# Patient Record
Sex: FEMALE | Race: WHITE | NOT HISPANIC OR LATINO | Employment: FULL TIME | URBAN - METROPOLITAN AREA
[De-identification: names, ages, dates, MRNs, and addresses within clinical notes are randomized per-mention and may not be internally consistent; named-entity substitution may affect disease eponyms.]

---

## 2023-10-13 ENCOUNTER — PHARMACY VISIT (OUTPATIENT)
Dept: PHARMACY | Facility: CLINIC | Age: 28
End: 2023-10-13

## 2023-10-13 ENCOUNTER — HOSPITAL ENCOUNTER (EMERGENCY)
Facility: HOSPITAL | Age: 28
Discharge: HOME | End: 2023-10-13
Payer: COMMERCIAL

## 2023-10-13 VITALS
SYSTOLIC BLOOD PRESSURE: 114 MMHG | TEMPERATURE: 98.2 F | BODY MASS INDEX: 21.66 KG/M2 | OXYGEN SATURATION: 99 % | DIASTOLIC BLOOD PRESSURE: 76 MMHG | HEART RATE: 85 BPM | WEIGHT: 130 LBS | HEIGHT: 65 IN | RESPIRATION RATE: 16 BRPM

## 2023-10-13 DIAGNOSIS — S09.90XA ACUTE HEAD INJURY WITHOUT LOSS OF CONSCIOUSNESS, INITIAL ENCOUNTER: Primary | ICD-10-CM

## 2023-10-13 DIAGNOSIS — S06.0X0A CONCUSSION WITHOUT LOSS OF CONSCIOUSNESS, INITIAL ENCOUNTER: ICD-10-CM

## 2023-10-13 PROCEDURE — 2500000005 HC RX 250 GENERAL PHARMACY W/O HCPCS

## 2023-10-13 PROCEDURE — 2500000004 HC RX 250 GENERAL PHARMACY W/ HCPCS (ALT 636 FOR OP/ED)

## 2023-10-13 PROCEDURE — RXMED WILLOW AMBULATORY MEDICATION CHARGE

## 2023-10-13 PROCEDURE — 99283 EMERGENCY DEPT VISIT LOW MDM: CPT

## 2023-10-13 PROCEDURE — S0119 ONDANSETRON 4 MG: HCPCS

## 2023-10-13 RX ORDER — ACETAMINOPHEN 325 MG/1
975 TABLET ORAL ONCE
Status: COMPLETED | OUTPATIENT
Start: 2023-10-13 | End: 2023-10-13

## 2023-10-13 RX ORDER — ONDANSETRON 4 MG/1
4 TABLET, ORALLY DISINTEGRATING ORAL EVERY 8 HOURS PRN
Qty: 20 TABLET | Refills: 0 | Status: SHIPPED | OUTPATIENT
Start: 2023-10-13 | End: 2023-10-20

## 2023-10-13 RX ORDER — ONDANSETRON 4 MG/1
4 TABLET, ORALLY DISINTEGRATING ORAL ONCE
Status: COMPLETED | OUTPATIENT
Start: 2023-10-13 | End: 2023-10-13

## 2023-10-13 RX ORDER — DULOXETIN HYDROCHLORIDE 60 MG/1
CAPSULE, DELAYED RELEASE ORAL
COMMUNITY
Start: 2022-05-18

## 2023-10-13 RX ADMIN — ONDANSETRON 4 MG: 4 TABLET, ORALLY DISINTEGRATING ORAL at 15:27

## 2023-10-13 RX ADMIN — ACETAMINOPHEN 975 MG: 325 TABLET ORAL at 15:27

## 2023-10-13 ASSESSMENT — LIFESTYLE VARIABLES
EVER HAD A DRINK FIRST THING IN THE MORNING TO STEADY YOUR NERVES TO GET RID OF A HANGOVER: NO
EVER FELT BAD OR GUILTY ABOUT YOUR DRINKING: NO
REASON UNABLE TO ASSESS: NO
HAVE YOU EVER FELT YOU SHOULD CUT DOWN ON YOUR DRINKING: NO
HAVE PEOPLE ANNOYED YOU BY CRITICIZING YOUR DRINKING: NO

## 2023-10-13 ASSESSMENT — PAIN SCALES - GENERAL: PAINLEVEL_OUTOF10: 8

## 2023-10-13 ASSESSMENT — COLUMBIA-SUICIDE SEVERITY RATING SCALE - C-SSRS
2. HAVE YOU ACTUALLY HAD ANY THOUGHTS OF KILLING YOURSELF?: NO
1. IN THE PAST MONTH, HAVE YOU WISHED YOU WERE DEAD OR WISHED YOU COULD GO TO SLEEP AND NOT WAKE UP?: NO
6. HAVE YOU EVER DONE ANYTHING, STARTED TO DO ANYTHING, OR PREPARED TO DO ANYTHING TO END YOUR LIFE?: NO

## 2023-10-13 ASSESSMENT — PAIN - FUNCTIONAL ASSESSMENT: PAIN_FUNCTIONAL_ASSESSMENT: 0-10

## 2023-10-13 NOTE — ED PROVIDER NOTES
Chief Complaint   Patient presents with    head pain, pt ran into a door frame, running from a wasp       28-year-old female arrives to the emergency department with a chief complaint of a right-sided frontal head injury.  The patient was running from a wasp, she turned around and there was a door jam, the patient struck the door jam.  Patient did not lose consciousness, the patient did not fall to the floor, the patient had abrupt onset of dizziness.  Patient has a mild hematoma with no open skin appreciated on the right frontal scalp.  The patient is alert and oriented x3, no focal neurological deficit, no unilateral weakness.  The patient speaking in full sentences.  The patient does endorse nausea with no vomiting.  Patient endorses a pain of 6 out of 10, patient has not taken anything for the pain.      History provided by:  Patient   used: No         PmHx, PsHx, Allergies, Family Hx, social Hx reviewed as documented    A complete 10 point review of systems was performed and is negative except for as mentioned in the HPI.    Physical Exam:    General: Patient is AAOx3, appears well developed, well nourished, is a good historian, answers questions appropriately    HEENT: head normocephalic, atraumatic, PERRLA, EOMs intact, oropharynx without erythema or exudate, buccal mucosa intact without lesions, TMs unremarkable, nose is patent bilateral    Neck: supple, full ROM, negative for lymphadenopathy, JVD, thyromegaly, tracheal deviation, nuccal rigidity    Pulmonary: CTAB, no accessory muscle use, able to speak full clear sentences    Cardiac: HRRR, no murmurs, rubs or gallops    GI: soft, non-tender, non-distended, BS + x 4, no masses or organomegaly, no guarding or CVA tenderness noted, negative cervantes's, mcburney's    Musculoskeletal: Pain in right frontal scalp, otherwise full weight bearing, TORRES, no joint effusions, clubbing or edema noted    Skin: Right frontal scalp hematoma per HPI,  otherwise intact, no lesions or rashes noted, turgor is good.    Neuro: patient follow commands, cranial nerves 2-12 grossly intact, motor strengths 5/5 upper and lower extremities, DTR's and sensation are symmetrical. No focal deficits.    Rectal/: No urinary burning, urgency, change in frequency.  Patient has no rectal complaints        Medical Decision Making  This patient was seen in the emergency department with an attending physician available at all times throughout their ED course    Primary consideration for the patient would be an intracranial traumatic process given the mechanism of injury, however the patient had no loss of consciousness, no projectile vomiting, no short-term memory loss, no vision changes, no objective focal neurological deficit, and a steady gait, through shared decision-making, the patient will not have any imaging done at this time.  Strict return precautions were discussed with the patient on the unlikely event of a developing intracranial process, the patient verbalized understanding.    Patient's ED course is most consistent with closed head injury without loss of consciousness and mild concussive syndrome    Patient given 975 mg of Tylenol as well as 4 mg of Zofran ODT.  Patient states that for at home symptom management she will continue take over-the-counter analgesics, patient given a prescription for the Zofran for any subsequent nausea    Patient is amenable to the plan of discharge as outlined above, all patient's questions pertaining to their ED course were answered in their entirety.  Strict return precautions were discussed with the patient and they verbalized understanding.  Further, it was made clear to the patient that from an emergent basis, all effort and testing was done to eliminate any imminent dangerous or potentially dangerous conditions of the patient however if their symptoms get much worse or feel life-threatening, they are to return to the emergency  "department or call 911 immediately.       Diagnoses as of 10/13/23 1509   Acute head injury without loss of consciousness, initial encounter   Concussion without loss of consciousness, initial encounter       The patient has had the following imaging during this ER visit: None     Patient History   History reviewed. No pertinent past medical history.  History reviewed. No pertinent surgical history.  No family history on file.  Social History     Tobacco Use    Smoking status: Former     Types: Cigarettes    Smokeless tobacco: Former   Vaping Use    Vaping Use: Every day   Substance Use Topics    Alcohol use: Not on file    Drug use: Not on file       ED Triage Vitals [10/13/23 1313]   Temp Heart Rate Resp BP   36.8 °C (98.2 °F) 85 16 114/76      SpO2 Temp src Heart Rate Source Patient Position   99 % -- -- --      BP Location FiO2 (%)     -- --       Vitals:    10/13/23 1313   BP: 114/76   Pulse: 85   Resp: 16   Temp: 36.8 °C (98.2 °F)   SpO2: 99%   Weight: 59 kg (130 lb)   Height: 1.651 m (5' 5\")               Wale Marsh, SUNIL-CNP  10/13/23 1511    "

## 2023-10-13 NOTE — DISCHARGE INSTRUCTIONS
As discussed, please take Tylenol as needed for discomfort and Zofran as needed for nausea, attempt to rest, postconcussive symptoms can last up to 72 hours

## 2023-11-03 ENCOUNTER — HOSPITAL ENCOUNTER (EMERGENCY)
Facility: HOSPITAL | Age: 28
Discharge: HOME | End: 2023-11-03
Attending: EMERGENCY MEDICINE
Payer: COMMERCIAL

## 2023-11-03 VITALS
WEIGHT: 140 LBS | HEART RATE: 77 BPM | OXYGEN SATURATION: 100 % | HEIGHT: 65 IN | SYSTOLIC BLOOD PRESSURE: 105 MMHG | TEMPERATURE: 98.3 F | DIASTOLIC BLOOD PRESSURE: 72 MMHG | BODY MASS INDEX: 23.32 KG/M2 | RESPIRATION RATE: 18 BRPM

## 2023-11-03 DIAGNOSIS — Z20.2 POSSIBLE EXPOSURE TO STD: Primary | ICD-10-CM

## 2023-11-03 LAB
APPEARANCE UR: ABNORMAL
BILIRUB UR STRIP.AUTO-MCNC: NEGATIVE MG/DL
CLUE CELLS SPEC QL WET PREP: NORMAL
COLOR UR: YELLOW
GLUCOSE UR STRIP.AUTO-MCNC: NEGATIVE MG/DL
GRAN CASTS #/AREA UR COMP ASSIST: ABNORMAL /LPF
HIV 1+2 AB+HIV1 P24 AG SERPL QL IA: NONREACTIVE
HOLD SPECIMEN: NORMAL
KETONES UR STRIP.AUTO-MCNC: NEGATIVE MG/DL
LEUKOCYTE ESTERASE UR QL STRIP.AUTO: NEGATIVE
MUCOUS THREADS #/AREA URNS AUTO: ABNORMAL /LPF
NITRITE UR QL STRIP.AUTO: NEGATIVE
PH UR STRIP.AUTO: 7 [PH]
PROT UR STRIP.AUTO-MCNC: ABNORMAL MG/DL
RBC # UR STRIP.AUTO: NEGATIVE /UL
RBC #/AREA URNS AUTO: ABNORMAL /HPF
SP GR UR STRIP.AUTO: 1.02
SQUAMOUS #/AREA URNS AUTO: ABNORMAL /HPF
T PALLIDUM AB SER QL: NONREACTIVE
T VAGINALIS SPEC QL WET PREP: NORMAL
TRICHOMONAS REFLEX COMMENT: NORMAL
UROBILINOGEN UR STRIP.AUTO-MCNC: <2 MG/DL
WBC #/AREA URNS AUTO: ABNORMAL /HPF
WBC VAG QL WET PREP: NORMAL
YEAST VAG QL WET PREP: NORMAL

## 2023-11-03 PROCEDURE — 2500000004 HC RX 250 GENERAL PHARMACY W/ HCPCS (ALT 636 FOR OP/ED)

## 2023-11-03 PROCEDURE — 86780 TREPONEMA PALLIDUM: CPT | Mod: PORLAB | Performed by: EMERGENCY MEDICINE

## 2023-11-03 PROCEDURE — 87205 SMEAR GRAM STAIN: CPT | Mod: PORLAB | Performed by: EMERGENCY MEDICINE

## 2023-11-03 PROCEDURE — 87661 TRICHOMONAS VAGINALIS AMPLIF: CPT | Mod: 59,PORLAB | Performed by: EMERGENCY MEDICINE

## 2023-11-03 PROCEDURE — 99283 EMERGENCY DEPT VISIT LOW MDM: CPT | Mod: 25

## 2023-11-03 PROCEDURE — 87800 DETECT AGNT MULT DNA DIREC: CPT | Mod: PORLAB | Performed by: EMERGENCY MEDICINE

## 2023-11-03 PROCEDURE — 2500000005 HC RX 250 GENERAL PHARMACY W/O HCPCS

## 2023-11-03 PROCEDURE — 81001 URINALYSIS AUTO W/SCOPE: CPT | Performed by: EMERGENCY MEDICINE

## 2023-11-03 PROCEDURE — 36415 COLL VENOUS BLD VENIPUNCTURE: CPT | Performed by: EMERGENCY MEDICINE

## 2023-11-03 PROCEDURE — 87389 HIV-1 AG W/HIV-1&-2 AB AG IA: CPT | Mod: PORLAB | Performed by: EMERGENCY MEDICINE

## 2023-11-03 PROCEDURE — 96372 THER/PROPH/DIAG INJ SC/IM: CPT

## 2023-11-03 PROCEDURE — 2500000002 HC RX 250 W HCPCS SELF ADMINISTERED DRUGS (ALT 637 FOR MEDICARE OP, ALT 636 FOR OP/ED)

## 2023-11-03 PROCEDURE — 99284 EMERGENCY DEPT VISIT MOD MDM: CPT | Performed by: EMERGENCY MEDICINE

## 2023-11-03 PROCEDURE — 87210 SMEAR WET MOUNT SALINE/INK: CPT | Performed by: EMERGENCY MEDICINE

## 2023-11-03 RX ORDER — CEFTRIAXONE 500 MG/1
500 INJECTION, POWDER, FOR SOLUTION INTRAMUSCULAR; INTRAVENOUS ONCE
Status: COMPLETED | OUTPATIENT
Start: 2023-11-03 | End: 2023-11-03

## 2023-11-03 RX ORDER — ACETAMINOPHEN 325 MG/1
975 TABLET ORAL ONCE
Status: COMPLETED | OUTPATIENT
Start: 2023-11-03 | End: 2023-11-03

## 2023-11-03 RX ORDER — ONDANSETRON 4 MG/1
TABLET, ORALLY DISINTEGRATING ORAL
Status: COMPLETED
Start: 2023-11-03 | End: 2023-11-03

## 2023-11-03 RX ORDER — LIDOCAINE HYDROCHLORIDE 10 MG/ML
INJECTION INFILTRATION; PERINEURAL
Status: COMPLETED
Start: 2023-11-03 | End: 2023-11-03

## 2023-11-03 RX ORDER — AZITHROMYCIN 250 MG/1
2000 TABLET, FILM COATED ORAL ONCE
Status: COMPLETED | OUTPATIENT
Start: 2023-11-03 | End: 2023-11-03

## 2023-11-03 RX ORDER — LIDOCAINE HYDROCHLORIDE 10 MG/ML
1 INJECTION INFILTRATION; PERINEURAL ONCE
Status: COMPLETED | OUTPATIENT
Start: 2023-11-03 | End: 2023-11-03

## 2023-11-03 RX ORDER — ONDANSETRON 4 MG/1
4 TABLET, FILM COATED ORAL ONCE
Status: DISCONTINUED | OUTPATIENT
Start: 2023-11-03 | End: 2023-11-03 | Stop reason: HOSPADM

## 2023-11-03 RX ADMIN — ACETAMINOPHEN 975 MG: 325 TABLET ORAL at 12:53

## 2023-11-03 RX ADMIN — LIDOCAINE HYDROCHLORIDE 1 ML: 10 INJECTION INFILTRATION; PERINEURAL at 15:09

## 2023-11-03 RX ADMIN — AZITHROMYCIN DIHYDRATE 2000 MG: 250 TABLET ORAL at 15:05

## 2023-11-03 RX ADMIN — CEFTRIAXONE SODIUM 500 MG: 500 INJECTION, POWDER, FOR SOLUTION INTRAMUSCULAR; INTRAVENOUS at 15:04

## 2023-11-03 RX ADMIN — LIDOCAINE HYDROCHLORIDE 1 ML: 10 INJECTION, SOLUTION INFILTRATION; PERINEURAL at 15:09

## 2023-11-03 RX ADMIN — ONDANSETRON 4 MG: 4 TABLET, ORALLY DISINTEGRATING ORAL at 12:53

## 2023-11-03 ASSESSMENT — PAIN DESCRIPTION - DESCRIPTORS: DESCRIPTORS: ACHING

## 2023-11-03 ASSESSMENT — PAIN - FUNCTIONAL ASSESSMENT: PAIN_FUNCTIONAL_ASSESSMENT: 0-10

## 2023-11-03 ASSESSMENT — PAIN DESCRIPTION - PAIN TYPE: TYPE: ACUTE PAIN

## 2023-11-03 ASSESSMENT — PAIN SCALES - GENERAL: PAINLEVEL_OUTOF10: 7

## 2023-11-03 ASSESSMENT — PAIN DESCRIPTION - LOCATION: LOCATION: ABDOMEN

## 2023-11-03 NOTE — ED PROVIDER NOTES
Chief Complaint   Patient presents with    std check       28-year-old female arrives to the emergency department with a chief complaint of a concern for STD exposure.  The patient states that over the last couple of weeks she has had intercourse with a new partner that has been unprotected, patient states that for the last 3 to 4 days she has had burning sensation as well as a white creamy discharge with any urination and or intercourse.  The patient has no previous history of an STD, the patient denies any fevers or chills at home.  The patient denies any other symptoms or complaints.  The patient has a longstanding history with severe depression and anxiety, stating that there is whole chain of events has caused an anxiety exacerbation, patient saw her psychologist yesterday and was started on additional medications.  Patient is current with all medication regimen.  Patient endorses mild nausea with no vomiting.      History provided by:  Patient   used: No         PmHx, PsHx, Allergies, Family Hx, social Hx reviewed as documented    A complete 10 point review of systems was performed and is negative except for as mentioned in the HPI.    Physical Exam:    General: Patient is AAOx3, appears well developed, well nourished, is a good historian, answers questions appropriately    HEENT: head normocephalic, atraumatic, PERRLA, EOMs intact, oropharynx without erythema or exudate, buccal mucosa intact without lesions, TMs unremarkable, nose is patent bilateral    Neck: supple, full ROM, negative for lymphadenopathy, JVD, thyromegaly, tracheal deviation, nuccal rigidity    Pulmonary: CTAB, no accessory muscle use, able to speak full clear sentences    Cardiac: HRRR, no murmurs, rubs or gallops    GI: soft, non-tender, non-distended, BS + x 4, no masses or organomegaly, no guarding or CVA tenderness noted, negative cervantes's, mcburney's    Musculoskeletal: full weight bearing, TORRES, no joint  effusions, clubbing or edema noted    Skin: intact, no lesions or rashes noted, turgor is good.    Neuro: patient follow commands, cranial nerves 2-12 grossly intact, motor strengths 5/5 upper and lower extremities, DTR's and sensation are symmetrical. No focal deficits.    Rectal/: Endorses burning with urination, denies any increased frequency or urgency patient has no rectal complaints    Pelvic exam: completed by myself with RN chaperone at bedside and vaginal cultures were obtained. Patient had no cervical or adnexal tenderness on exam. Exam revealed no labial or vaginal lesions, no blood or vaginal discharge.        Medical Decision Making  This patient was seen, treated, and evaluated in conjunction with Dr. Roberto Beverly    Primary consideration for this patient would be STD exposure, the patient was given the option after the pelvic exam to be treated prophylactically, or wait for the swabs to come back, and be treated at that time if in fact she was positive.  Given the patient does have copious amounts of weight creamy discharge appreciated, however there is no foul odor indicating BV.  Patient will be treated prophylactically for gonorrhea and chlamydia with Rocephin and azithromycin, it was considered to treat the patient with doxycycline as reviewed with literature, however I am not convinced that the patient will be compliant with this medication regimen.    The patient's wet prep is negative for clue cells, yeast, trichomonas, patient will not have any treatment for this at this time.  The patient's urinalysis is also negative for any acute abnormality.    The patient was concerned with all STDs, requesting blood work and or swabbing, HIV and syphilis testing will also be used to further evaluate, and will be sent out without the results, patient verbalized understanding that with any of the testing that she will be contacted if there are any positive results.    The patient's ED course is most  "consistent with a  STD exposure    Patient is amenable to the plan of discharge as outlined above, all patient's questions pertaining to their ED course were answered in their entirety.  Strict return precautions were discussed with the patient and they verbalized understanding.  Further, it was made clear to the patient that from an emergent basis, all effort and testing was done to eliminate any imminent dangerous or potentially dangerous conditions of the patient however if their symptoms get much worse or feel life-threatening, they are to return to the emergency department or call 911 immediately.    Amount and/or Complexity of Data Reviewed  Labs: ordered. Decision-making details documented in ED Course.       Diagnoses as of 11/03/23 1448   Possible exposure to STD       The patient has had the following imaging during this ER visit: NURSING COMMUNICATION     Patient History   History reviewed. No pertinent past medical history.  History reviewed. No pertinent surgical history.  No family history on file.  Social History     Tobacco Use    Smoking status: Former     Types: Cigarettes    Smokeless tobacco: Former   Vaping Use    Vaping Use: Every day   Substance Use Topics    Alcohol use: Not on file    Drug use: Not on file       ED Triage Vitals [11/03/23 1131]   Temp Heart Rate Resp BP   36.8 °C (98.3 °F) 77 18 105/72      SpO2 Temp Source Heart Rate Source Patient Position   100 % Temporal Monitor Sitting      BP Location FiO2 (%)     Left arm --       Vitals:    11/03/23 1131   BP: 105/72   BP Location: Left arm   Patient Position: Sitting   Pulse: 77   Resp: 18   Temp: 36.8 °C (98.3 °F)   TempSrc: Temporal   SpO2: 100%   Weight: 63.5 kg (140 lb)   Height: 1.651 m (5' 5\")               SUNIL Qureshi-CNP  11/03/23 1448    "

## 2023-11-03 NOTE — ED PROVIDER NOTES
HPI   Chief Complaint   Patient presents with    std check       HPI                    Naty Coma Scale Score: 15                  Patient History   History reviewed. No pertinent past medical history.  History reviewed. No pertinent surgical history.  No family history on file.  Social History     Tobacco Use    Smoking status: Former     Types: Cigarettes    Smokeless tobacco: Former   Vaping Use    Vaping Use: Every day   Substance Use Topics    Alcohol use: Not on file    Drug use: Not on file       Physical Exam   ED Triage Vitals [11/03/23 1131]   Temp Heart Rate Resp BP   36.8 °C (98.3 °F) 77 18 105/72      SpO2 Temp Source Heart Rate Source Patient Position   100 % Temporal Monitor Sitting      BP Location FiO2 (%)     Left arm --       Physical Exam    ED Course & MDM        Medical Decision Making      Procedure  Procedures

## 2023-11-03 NOTE — DISCHARGE INSTRUCTIONS
If any of your swabbing and/or blood work is positive, you will be contacted at the number you have on file

## 2023-11-04 LAB
CLUE CELLS VAG LPF-#/AREA: NORMAL /[LPF]
NUGENT SCORE: 2
YEAST VAG WET PREP-#/AREA: NORMAL

## 2023-11-05 LAB
C TRACH RRNA SPEC QL NAA+PROBE: NEGATIVE
N GONORRHOEA DNA SPEC QL PROBE+SIG AMP: NEGATIVE

## 2023-11-07 LAB — T VAGINALIS RRNA SPEC QL NAA+PROBE: NEGATIVE

## 2023-11-22 PROCEDURE — 87205 SMEAR GRAM STAIN: CPT

## 2023-11-22 PROCEDURE — 87086 URINE CULTURE/COLONY COUNT: CPT

## 2023-11-23 ENCOUNTER — LAB REQUISITION (OUTPATIENT)
Dept: LAB | Facility: HOSPITAL | Age: 28
End: 2023-11-23
Payer: COMMERCIAL

## 2023-11-23 DIAGNOSIS — R30.0 DYSURIA: ICD-10-CM

## 2023-11-23 LAB
CLUE CELLS VAG LPF-#/AREA: NORMAL /[LPF]
NUGENT SCORE: 0
YEAST VAG WET PREP-#/AREA: NORMAL

## 2023-11-24 ENCOUNTER — HOSPITAL ENCOUNTER (EMERGENCY)
Facility: HOSPITAL | Age: 28
Discharge: HOME | End: 2023-11-24
Payer: COMMERCIAL

## 2023-11-24 ENCOUNTER — PHARMACY VISIT (OUTPATIENT)
Dept: PHARMACY | Facility: CLINIC | Age: 28
End: 2023-11-24
Payer: MEDICAID

## 2023-11-24 VITALS
HEIGHT: 65 IN | RESPIRATION RATE: 18 BRPM | OXYGEN SATURATION: 98 % | DIASTOLIC BLOOD PRESSURE: 73 MMHG | HEART RATE: 97 BPM | SYSTOLIC BLOOD PRESSURE: 111 MMHG | WEIGHT: 130 LBS | BODY MASS INDEX: 21.66 KG/M2 | TEMPERATURE: 97.3 F

## 2023-11-24 DIAGNOSIS — N30.00 ACUTE CYSTITIS WITHOUT HEMATURIA: Primary | ICD-10-CM

## 2023-11-24 LAB
APPEARANCE UR: CLEAR
BACTERIA #/AREA URNS AUTO: ABNORMAL /HPF
BACTERIA UR CULT: NORMAL
BILIRUB UR STRIP.AUTO-MCNC: NEGATIVE MG/DL
COLOR UR: ABNORMAL
GLUCOSE UR STRIP.AUTO-MCNC: NEGATIVE MG/DL
KETONES UR STRIP.AUTO-MCNC: NEGATIVE MG/DL
LEUKOCYTE ESTERASE UR QL STRIP.AUTO: ABNORMAL
NITRITE UR QL STRIP.AUTO: NEGATIVE
PH UR STRIP.AUTO: 7 [PH]
PROT UR STRIP.AUTO-MCNC: NEGATIVE MG/DL
RBC # UR STRIP.AUTO: ABNORMAL /UL
RBC #/AREA URNS AUTO: ABNORMAL /HPF
S PYO DNA THROAT QL NAA+PROBE: NOT DETECTED
SP GR UR STRIP.AUTO: 1
SQUAMOUS #/AREA URNS AUTO: ABNORMAL /HPF
UROBILINOGEN UR STRIP.AUTO-MCNC: <2 MG/DL
WBC #/AREA URNS AUTO: ABNORMAL /HPF

## 2023-11-24 PROCEDURE — RXMED WILLOW AMBULATORY MEDICATION CHARGE

## 2023-11-24 PROCEDURE — 81001 URINALYSIS AUTO W/SCOPE: CPT | Performed by: NURSE PRACTITIONER

## 2023-11-24 PROCEDURE — 99285 EMERGENCY DEPT VISIT HI MDM: CPT | Mod: 25

## 2023-11-24 PROCEDURE — 94760 N-INVAS EAR/PLS OXIMETRY 1: CPT

## 2023-11-24 PROCEDURE — 87651 STREP A DNA AMP PROBE: CPT | Performed by: NURSE PRACTITIONER

## 2023-11-24 PROCEDURE — 99283 EMERGENCY DEPT VISIT LOW MDM: CPT

## 2023-11-24 RX ORDER — CEPHALEXIN 500 MG/1
500 CAPSULE ORAL 2 TIMES DAILY
Qty: 14 CAPSULE | Refills: 0 | Status: SHIPPED | OUTPATIENT
Start: 2023-11-24 | End: 2023-12-01

## 2023-11-24 ASSESSMENT — LIFESTYLE VARIABLES
EVER HAD A DRINK FIRST THING IN THE MORNING TO STEADY YOUR NERVES TO GET RID OF A HANGOVER: NO
REASON UNABLE TO ASSESS: NO
EVER FELT BAD OR GUILTY ABOUT YOUR DRINKING: NO
HAVE PEOPLE ANNOYED YOU BY CRITICIZING YOUR DRINKING: NO
HAVE YOU EVER FELT YOU SHOULD CUT DOWN ON YOUR DRINKING: NO

## 2023-11-24 ASSESSMENT — COLUMBIA-SUICIDE SEVERITY RATING SCALE - C-SSRS
1. IN THE PAST MONTH, HAVE YOU WISHED YOU WERE DEAD OR WISHED YOU COULD GO TO SLEEP AND NOT WAKE UP?: NO
6. HAVE YOU EVER DONE ANYTHING, STARTED TO DO ANYTHING, OR PREPARED TO DO ANYTHING TO END YOUR LIFE?: NO
2. HAVE YOU ACTUALLY HAD ANY THOUGHTS OF KILLING YOURSELF?: NO

## 2023-11-24 ASSESSMENT — PAIN SCALES - GENERAL: PAINLEVEL_OUTOF10: 8

## 2023-11-24 ASSESSMENT — PAIN DESCRIPTION - DESCRIPTORS: DESCRIPTORS: ACHING

## 2023-11-24 ASSESSMENT — PAIN - FUNCTIONAL ASSESSMENT: PAIN_FUNCTIONAL_ASSESSMENT: 0-10

## 2023-11-24 NOTE — ED PROVIDER NOTES
HPI   Chief Complaint   Patient presents with    concerned for UTI/ also c/o sore throat and body aches       This is a 28-year-old  female presenting to the emergency room with multiple medical complaints.  Patient states that she has been experiencing pain with urination and pain with wiping for the last couple of days.  The patient endorses having a fever and having bodyaches that developed overnight.  Her temperature was as high as 102.  She took ibuprofen at midnight.  She denies any vaginal symptoms or concern for STD.  She states that she went to the urgent care today and had STD testing performed and was told that it was negative.  Patient did not discuss having pain in her throat with white spots.  She denies any cough or congestion symptoms.  She is not having any abdominal pain.  She denies any hematuria.  She has not had any urinary tract infections in the past.      History provided by:  Patient   used: No                        Naty Coma Scale Score: 15                  Patient History   No past medical history on file.  No past surgical history on file.  No family history on file.  Social History     Tobacco Use    Smoking status: Former     Types: Cigarettes    Smokeless tobacco: Former   Vaping Use    Vaping Use: Every day   Substance Use Topics    Alcohol use: Not on file    Drug use: Not on file       Physical Exam   ED Triage Vitals [11/24/23 0917]   Temp Heart Rate Resp BP   36.3 °C (97.3 °F) 97 18 111/73      SpO2 Temp src Heart Rate Source Patient Position   98 % -- -- --      BP Location FiO2 (%)     -- --       Physical Exam  Vitals and nursing note reviewed.   HENT:      Head: Normocephalic and atraumatic.      Right Ear: Tympanic membrane and external ear normal.      Left Ear: Tympanic membrane and external ear normal.      Nose: Nose normal.      Mouth/Throat:      Comments: The patient has no significant tonsillar enlargement or erythema appreciated.  The  patient does have white exudate noted to the bilateral tonsils.  Tonsils are symmetric.  Uvula is midline.  Phonation intact.  Eyes:      Extraocular Movements: Extraocular movements intact.      Pupils: Pupils are equal, round, and reactive to light.   Cardiovascular:      Rate and Rhythm: Normal rate and regular rhythm.      Pulses: Normal pulses.      Heart sounds: Normal heart sounds.   Pulmonary:      Effort: Pulmonary effort is normal.      Breath sounds: Normal breath sounds.   Abdominal:      General: Bowel sounds are normal.      Palpations: Abdomen is soft.   Musculoskeletal:         General: Normal range of motion.      Cervical back: Normal range of motion and neck supple.   Skin:     General: Skin is warm and dry.      Capillary Refill: Capillary refill takes less than 2 seconds.   Neurological:      General: No focal deficit present.      Mental Status: She is alert.   Psychiatric:         Mood and Affect: Mood normal.         ED Course & MDM   Diagnoses as of 11/24/23 1100   Acute cystitis without hematuria       Medical Decision Making  The patient was seen and examined by the nurse practitioner, Rhina Aguirre.  Patient is presenting to the emergency room with complaints of a sore throat and urinary symptoms.  Her routine urinalysis was obtained and was positive for leukocyte esterase, WBCs, bacteria.  We will treat the patient for urinary tract infection.  She will be placed on Keflex for antibiotic coverage.  A rapid strep test was performed and was negative.  The patient may be suffering from tonsil stones versus an actual bacterial infection.  The patient was notified of her laboratory results.  The patient is to follow up with their primary care physician in the next 2-3 days.  The patient is to return to the ED worse in any way.  The patient was discharged in stable condition with computer discharge instructions given. Patient was agreeable with discharge  planning.        Procedure  Procedures     SUNIL Hester-HARSH  11/24/23 1055       SUNIL Hester-CNP  11/24/23 1100

## 2023-11-29 ENCOUNTER — LAB REQUISITION (OUTPATIENT)
Dept: LAB | Facility: HOSPITAL | Age: 28
End: 2023-11-29
Payer: COMMERCIAL

## 2023-11-29 DIAGNOSIS — L73.1 PSEUDOFOLLICULITIS BARBAE: ICD-10-CM

## 2023-11-29 LAB
HOLD SPECIMEN: NORMAL
HOLD SPECIMEN: NORMAL

## 2023-12-21 ENCOUNTER — APPOINTMENT (OUTPATIENT)
Dept: RADIOLOGY | Facility: HOSPITAL | Age: 28
End: 2023-12-21
Payer: COMMERCIAL

## 2023-12-21 ENCOUNTER — HOSPITAL ENCOUNTER (EMERGENCY)
Facility: HOSPITAL | Age: 28
Discharge: HOME | End: 2023-12-21
Payer: COMMERCIAL

## 2023-12-21 VITALS
HEIGHT: 65 IN | HEART RATE: 86 BPM | DIASTOLIC BLOOD PRESSURE: 85 MMHG | TEMPERATURE: 98.6 F | RESPIRATION RATE: 18 BRPM | WEIGHT: 140 LBS | OXYGEN SATURATION: 97 % | SYSTOLIC BLOOD PRESSURE: 127 MMHG | BODY MASS INDEX: 23.32 KG/M2

## 2023-12-21 DIAGNOSIS — S60.211A CONTUSION OF RIGHT WRIST, INITIAL ENCOUNTER: Primary | ICD-10-CM

## 2023-12-21 PROCEDURE — 73130 X-RAY EXAM OF HAND: CPT | Mod: RT,FR

## 2023-12-21 PROCEDURE — 73130 X-RAY EXAM OF HAND: CPT | Mod: RIGHT SIDE | Performed by: RADIOLOGY

## 2023-12-21 PROCEDURE — 73110 X-RAY EXAM OF WRIST: CPT | Mod: RT,FR

## 2023-12-21 PROCEDURE — 73110 X-RAY EXAM OF WRIST: CPT | Mod: RIGHT SIDE | Performed by: RADIOLOGY

## 2023-12-21 PROCEDURE — 99284 EMERGENCY DEPT VISIT MOD MDM: CPT

## 2023-12-21 RX ORDER — ACETAMINOPHEN 325 MG/1
650 TABLET ORAL ONCE
Status: COMPLETED | OUTPATIENT
Start: 2023-12-21 | End: 2023-12-21

## 2023-12-21 RX ADMIN — ACETAMINOPHEN 650 MG: 325 TABLET ORAL at 17:55

## 2023-12-21 ASSESSMENT — PAIN - FUNCTIONAL ASSESSMENT: PAIN_FUNCTIONAL_ASSESSMENT: 0-10

## 2023-12-21 ASSESSMENT — PAIN DESCRIPTION - LOCATION: LOCATION: HAND

## 2023-12-21 ASSESSMENT — PAIN DESCRIPTION - ORIENTATION: ORIENTATION: RIGHT

## 2023-12-21 ASSESSMENT — PAIN SCALES - GENERAL: PAINLEVEL_OUTOF10: 7

## 2023-12-21 NOTE — ED PROVIDER NOTES
"HPI   Chief Complaint   Patient presents with   • right 4th and 5th finger injury- workers comp       Patient is a 28-year-old female with no reported past medical history who presents emergency room today with a complaint of right fourth/fifth finger pain as well as right wrist pain.  She states she was at work and tried to remove some paper towels from the dispenser but the paper towels were stuck.  She states \"I pulled down with all my strength and hit my wrist on the metal garbage can below the dispenser\".  She describes the pain as primarily in the ulnar side of the wrist and fifth metacarpal, nonradiating, aching, constant and a 9 out of 10.  She states movement makes the pain worse.  Nothing makes the pain better.  She denies any other injuries.  She also denies any numbness, tingling, weakness or change in skin color or temperature.  Patient is not on blood thinners.  She has no other complaints or concerns at this time.                          Naty Coma Scale Score: 15                  Patient History   No past medical history on file.  No past surgical history on file.  No family history on file.  Social History     Tobacco Use   • Smoking status: Former     Types: Cigarettes   • Smokeless tobacco: Former   Vaping Use   • Vaping Use: Every day   Substance Use Topics   • Alcohol use: Not on file   • Drug use: Not on file       Physical Exam   ED Triage Vitals [12/21/23 1705]   Temp Heart Rate Resp BP   37 °C (98.6 °F) 86 18 127/85      SpO2 Temp src Heart Rate Source Patient Position   97 % -- -- --      BP Location FiO2 (%)     -- --       Physical Exam    ED Course & MDM   Diagnoses as of 12/21/23 2005   Contusion of right wrist, initial encounter       Medical Decision Making  Patient is a 28-year-old female with no reported past medical history who presents emergency room today with a complaint of right fourth/fifth finger pain as well as right wrist pain.  She states she was at work and tried to " HEART/VASCULAR INSTRUCTIONS    Your doctor has ordered a/an Holter Monitor for you, this will be done at the [place] [address] and [facility] located at [address] across the [address] from [facility]. On the day of your appointment, please report to Registration on the [facility] [address] [facility], 20 minutes before your test to complete any needed paperwork. If you cannot keep this appointment, please call [phone] to cancel and reschedule your appointment. Patient to return to clinic 6 weeks (3/19/21) [facility] visit  AVS reviewed and mailed to pt. It is very important for your care that you keep your appointment. If for some reason you are unable to keep your appointment it is equally important that you call our office at [phone] to cancel your appointment and reschedule. Failure to do so may result in your termination from our practice.   MB "remove some paper towels from the dispenser but the paper towels were stuck.  She states \"I pulled down with all my strength and hit my wrist on the metal garbage can below the dispenser\".  She describes the pain as primarily in the ulnar side of the wrist and fifth metacarpal, nonradiating, aching, constant and a 9 out of 10.  She states movement makes the pain worse.  Nothing makes the pain better.  She denies any other injuries.  She also denies any numbness, tingling, weakness or change in skin color or temperature.  Patient is not on blood thinners.  Upon initial assessment patient is in no acute distress.  She is alert and oriented.  She is talking on the phone and texting without difficulty.  She is answering questions appropriately.  She is afebrile appears well-hydrated.  Vitals are within normal limits.  Physical exam as described above.    DDx: Wrist fracture, metacarpal fracture, contusion, other    Wrist and hand x-rays ordered to rule out any acute osteo abnormality.  Patient was treated with 650 mg of Tylenol p.o.    X-ray imaging interpreted as above.  I discussed these findings with the patient.  I advised her that at this time, I feel she be safely discharged home with strict return precautions.    I discussed the differential, results and discharge plan with the patient.  I emphasized the importance of follow-up with the primary care  physician in the next 2-3 days if she does not see significant improvement. We discussed red flags to be aware of and monitor for.  I explained reasons for the patient to return to the Emergency Department. Additional verbal discharge instructions were also given and discussed with the patient to supplement those generated by the EMR.    I encouraged her to treat any pain or discomfort she has with Tylenol and/or ibuprofen as needed and as directed.    They understand return precautions and discharge instructions. The patient and/or family/friend/caregiver expressed " understanding. All questions and concerns addressed.                   Amount and/or Complexity of Data Reviewed  Radiology: ordered and independent interpretation performed.     Details: Right Hand: No acute osseous abnormalities  Right Wrist: No acute osseous abnormalities        Procedure  Procedures     SUNIL Florian-CNP  12/21/23 8972

## 2024-01-10 ENCOUNTER — HOSPITAL ENCOUNTER (EMERGENCY)
Facility: HOSPITAL | Age: 29
Discharge: HOME | End: 2024-01-10
Attending: STUDENT IN AN ORGANIZED HEALTH CARE EDUCATION/TRAINING PROGRAM
Payer: COMMERCIAL

## 2024-01-10 VITALS
OXYGEN SATURATION: 99 % | BODY MASS INDEX: 23.9 KG/M2 | WEIGHT: 140 LBS | HEIGHT: 64 IN | SYSTOLIC BLOOD PRESSURE: 124 MMHG | RESPIRATION RATE: 16 BRPM | TEMPERATURE: 0.1 F | HEART RATE: 69 BPM | DIASTOLIC BLOOD PRESSURE: 81 MMHG

## 2024-01-10 DIAGNOSIS — S46.812A TRAPEZIUS STRAIN, LEFT, INITIAL ENCOUNTER: Primary | ICD-10-CM

## 2024-01-10 PROCEDURE — 99283 EMERGENCY DEPT VISIT LOW MDM: CPT

## 2024-01-10 PROCEDURE — 20552 NJX 1/MLT TRIGGER POINT 1/2: CPT

## 2024-01-10 PROCEDURE — 2500000005 HC RX 250 GENERAL PHARMACY W/O HCPCS: Performed by: NURSE PRACTITIONER

## 2024-01-10 PROCEDURE — 99284 EMERGENCY DEPT VISIT MOD MDM: CPT | Performed by: STUDENT IN AN ORGANIZED HEALTH CARE EDUCATION/TRAINING PROGRAM

## 2024-01-10 PROCEDURE — 96372 THER/PROPH/DIAG INJ SC/IM: CPT | Performed by: NURSE PRACTITIONER

## 2024-01-10 PROCEDURE — 2500000004 HC RX 250 GENERAL PHARMACY W/ HCPCS (ALT 636 FOR OP/ED): Performed by: NURSE PRACTITIONER

## 2024-01-10 PROCEDURE — 96372 THER/PROPH/DIAG INJ SC/IM: CPT

## 2024-01-10 RX ORDER — TRIAMCINOLONE ACETONIDE 40 MG/ML
40 INJECTION, SUSPENSION INTRA-ARTICULAR; INTRAMUSCULAR ONCE
Status: COMPLETED | OUTPATIENT
Start: 2024-01-10 | End: 2024-01-10

## 2024-01-10 RX ORDER — LIDOCAINE HYDROCHLORIDE 10 MG/ML
10 INJECTION INFILTRATION; PERINEURAL ONCE
Status: COMPLETED | OUTPATIENT
Start: 2024-01-10 | End: 2024-01-10

## 2024-01-10 RX ORDER — BUPIVACAINE HYDROCHLORIDE 2.5 MG/ML
10 INJECTION, SOLUTION INFILTRATION; PERINEURAL ONCE
Status: COMPLETED | OUTPATIENT
Start: 2024-01-10 | End: 2024-01-10

## 2024-01-10 RX ADMIN — LIDOCAINE HYDROCHLORIDE 100 MG: 10 INJECTION, SOLUTION INFILTRATION; PERINEURAL at 15:18

## 2024-01-10 RX ADMIN — BUPIVACAINE HYDROCHLORIDE 25 MG: 2.5 INJECTION, SOLUTION INFILTRATION; PERINEURAL at 15:42

## 2024-01-10 RX ADMIN — TRIAMCINOLONE ACETONIDE 40 MG: 40 INJECTION, SUSPENSION INTRA-ARTICULAR; INTRAMUSCULAR at 15:18

## 2024-01-10 ASSESSMENT — PAIN SCALES - GENERAL: PAINLEVEL_OUTOF10: 9

## 2024-01-10 ASSESSMENT — PAIN - FUNCTIONAL ASSESSMENT: PAIN_FUNCTIONAL_ASSESSMENT: 0-10

## 2024-01-10 ASSESSMENT — PAIN DESCRIPTION - LOCATION: LOCATION: BACK

## 2024-01-10 NOTE — ED PROVIDER NOTES
HPI   Chief Complaint   Patient presents with    Back Pain     X 1 day no injury       This is a 28-year-old  female presenting to the emergency room with complaints of left neck pain.  The patient woke up 2 days ago and reported that she was experiencing pain in the left side of her neck and into her back.  It was very hard for her to get out of bed.  The patient reported worsening symptoms of the next morning upon waking.  It is hard for her to turn her head to the side.  The patient reports that she experiences pain whenever she lifts her arm.  She denies any paresthesias or focal weakness.  She does not have any fever or cold-like symptoms.  She denies any traumatic injury.  Patient has been taking ibuprofen and Tylenol with no significant relief of her pain symptoms.  She does not have a history of chronic back pain                          [place] Coma Scale Score: 15                  Patient History   No past medical history on file.  No past surgical history on file.  No family history on file.  Social History     Tobacco Use    Smoking status: Former     Types: Cigarettes    Smokeless tobacco: Former   Vaping Use    Vaping Use: Every day   Substance Use Topics    Alcohol use: Not on file    Drug use: Not on file       Physical Exam   ED Triage Vitals [01/10/24 1415]   Temp Heart Rate Resp BP   (!) -17.7 °C (0.1 °F) 69 16 124/81      SpO2 Temp src Heart Rate Source Patient Position   99 % -- -- Sitting      BP Location FiO2 (%)     Left arm --       Physical Exam  Vitals and nursing note reviewed.   HENT:      Head: Normocephalic and atraumatic.      Right Ear: External ear normal.      Left Ear: External ear normal.      Nose: Nose normal.      Mouth/Throat:      Pharynx: Oropharynx is clear.   Eyes:      Conjunctiva/sclera: Conjunctivae normal.   Neck:      Thyroid: No thyroid mass.      Trachea: Trachea normal.      Comments: The patient has tenderness to the left sternomastoid muscle.  The  patient has pain with deviation of the head to the left.  Cardiovascular:      Rate and Rhythm: Normal rate and regular rhythm.      Pulses: Normal pulses.      Heart sounds: Normal heart sounds.   Pulmonary:      Effort: Pulmonary effort is normal.      Breath sounds: Normal breath sounds.   Abdominal:      General: Bowel sounds are normal.      Palpations: Abdomen is soft.   Musculoskeletal:         General: Tenderness present. No signs of injury.      Cervical back: Neck supple. No signs of trauma, rigidity or torticollis. Decreased range of motion.      Comments: Patient has focal tenderness noted to the left trapezius muscle with noted spasm.   Skin:     General: Skin is warm.      Capillary Refill: Capillary refill takes less than 2 seconds.   Neurological:      General: No focal deficit present.      Mental Status: She is alert.   Psychiatric:         Mood and Affect: Mood normal.         ED Course & MDM   Diagnoses as of 01/10/24 1609   Trapezius strain, left, initial encounter       Medical Decision Making  Patient was seen and evaluated with the attending physician, Dr. Waters.  The patient is presenting to the emergency room with complaints of left neck and back pain.  Patient did not have a traumatic injury.  She does not have any neurological deficits.  Patient has reproducible pain noted in the left trapezius muscle with noted spasm on exam.  We do not feel that imaging is warranted at this time.  Treatment options were discussed with the patient.  Trigger point injections were discussed including all risks and benefits.  The patient was agreeable with the procedure.  Please see procedure note for details.  The patient tolerated the procedure well.  She reported home relief of the stiffness in the muscle.  The patient was educated on rice therapy.  She is to apply Biofreeze to the area and use Tylenol or Motrin over-the-counter as needed for pain.  The patient is to follow-up with her primary  care physician in the next 2 to 3 days.  She is to return if worse in any way.  The patient was discharged in stable condition with computer discharge instructions given.        Procedure  Procedures     SUNIL Hester-HARSH  01/10/24 5406

## 2024-01-11 NOTE — ED PROCEDURE NOTE
Procedure  General - Trigger point injections    Performed by: Kenny Waters MD  Authorized by: Kenny Waters MD    Consent:     Consent obtained:  Verbal    Consent given by:  Patient    Risks, benefits, and alternatives were discussed: yes      Risks discussed:  Bleeding and infection    Alternatives discussed:  Alternative treatment  Universal protocol:     Patient identity confirmed:  Verbally with patient  Indications:     Indications:  L trapezius pain  Pre-procedure details:     Skin preparation:  Chlorhexidine  Sedation:     Sedation type:  None  Anesthesia:     Anesthesia method:  Local infiltration    Local anesthetic:  Bupivacaine 0.25% w/o epi and lidocaine 1% w/o epi (kenalog)  Procedure specific details:      Anatomical landmarks were identified for left-sided trapezius trigger point injections.  5 cc of 0.25% bupivacaine without epi, 5 cc of 1% lidocaine without epi, and 1 cc of Kenalog were placed into a syringe and mixed thoroughly.  The patient's left trapezius was exposed and cleaned with chlorhexidine.  Trigger point injections were done with all 11 cc of the premixed solution.  This was done under sterile technique.  Aspiration was performed at each location to ensure appropriate location.  Post-procedure details:     Procedure completion:  Tolerated well, no immediate complications             Kenny Waters MD  01/10/24 7513

## 2024-03-15 ENCOUNTER — OFFICE VISIT (OUTPATIENT)
Dept: URGENT CARE | Facility: CLINIC | Age: 29
End: 2024-03-15
Payer: COMMERCIAL

## 2024-03-15 VITALS
OXYGEN SATURATION: 98 % | DIASTOLIC BLOOD PRESSURE: 85 MMHG | WEIGHT: 137 LBS | TEMPERATURE: 98.7 F | BODY MASS INDEX: 23.52 KG/M2 | SYSTOLIC BLOOD PRESSURE: 124 MMHG | HEART RATE: 89 BPM

## 2024-03-15 DIAGNOSIS — T36.95XA ANTIBIOTIC-INDUCED YEAST INFECTION: ICD-10-CM

## 2024-03-15 DIAGNOSIS — B37.2 CANDIDAL SKIN INFECTION: ICD-10-CM

## 2024-03-15 DIAGNOSIS — R30.0 DYSURIA: Primary | ICD-10-CM

## 2024-03-15 DIAGNOSIS — R10.2 VAGINAL PAIN: ICD-10-CM

## 2024-03-15 DIAGNOSIS — B37.9 ANTIBIOTIC-INDUCED YEAST INFECTION: ICD-10-CM

## 2024-03-15 PROBLEM — F29 PSYCHOSIS (MULTI): Status: ACTIVE | Noted: 2023-08-17

## 2024-03-15 PROBLEM — N39.0 URINARY TRACT INFECTION: Status: RESOLVED | Noted: 2023-11-26 | Resolved: 2024-03-15

## 2024-03-15 PROBLEM — K92.1 HEMATOCHEZIA: Status: RESOLVED | Noted: 2023-05-10 | Resolved: 2024-03-15

## 2024-03-15 PROBLEM — R11.0 NAUSEA: Status: RESOLVED | Noted: 2021-11-05 | Resolved: 2024-03-15

## 2024-03-15 PROBLEM — T76.21XA: Status: RESOLVED | Noted: 2023-08-17 | Resolved: 2024-03-15

## 2024-03-15 PROBLEM — R07.9 LEFT-SIDED CHEST PAIN: Status: RESOLVED | Noted: 2024-03-15 | Resolved: 2024-03-15

## 2024-03-15 PROBLEM — E55.9 VITAMIN D DEFICIENCY: Status: ACTIVE | Noted: 2020-09-19

## 2024-03-15 PROBLEM — F91.3 OPPOSITIONAL DEFIANT DISORDER: Status: ACTIVE | Noted: 2024-03-15

## 2024-03-15 PROBLEM — F32.9 MAJOR DEPRESSIVE DISORDER, SINGLE EPISODE: Status: ACTIVE | Noted: 2024-03-15

## 2024-03-15 PROBLEM — K21.9 GERD (GASTROESOPHAGEAL REFLUX DISEASE): Status: ACTIVE | Noted: 2023-02-02

## 2024-03-15 PROBLEM — N76.4 ABSCESS OF VULVA: Status: RESOLVED | Noted: 2024-03-15 | Resolved: 2024-03-15

## 2024-03-15 PROBLEM — K64.4 RESIDUAL HEMORRHOIDAL SKIN TAGS: Status: RESOLVED | Noted: 2023-05-10 | Resolved: 2024-03-15

## 2024-03-15 PROBLEM — S60.00XA CONTUSION OF FINGER: Status: RESOLVED | Noted: 2024-03-15 | Resolved: 2024-03-15

## 2024-03-15 PROBLEM — E53.8 VITAMIN B12 DEFICIENCY WITHOUT ANEMIA: Status: ACTIVE | Noted: 2020-09-19

## 2024-03-15 PROBLEM — K58.9 IRRITABLE BOWEL SYNDROME: Status: ACTIVE | Noted: 2024-03-15

## 2024-03-15 PROBLEM — D50.9 IRON DEFICIENCY ANEMIA: Status: ACTIVE | Noted: 2021-07-20

## 2024-03-15 PROBLEM — S46.812A STRAIN OF LEFT TRAPEZIUS MUSCLE: Status: RESOLVED | Noted: 2024-03-15 | Resolved: 2024-03-15

## 2024-03-15 LAB
POC APPEARANCE, URINE: CLEAR
POC BILIRUBIN, URINE: NEGATIVE
POC BLOOD, URINE: NEGATIVE
POC COLOR, URINE: NORMAL
POC GLUCOSE, URINE: NEGATIVE MG/DL
POC KETONES, URINE: NEGATIVE MG/DL
POC LEUKOCYTES, URINE: NEGATIVE
POC NITRITE,URINE: NEGATIVE
POC PH, URINE: 7 PH
POC PROTEIN, URINE: NEGATIVE MG/DL
POC SPECIFIC GRAVITY, URINE: 1.01
POC UROBILINOGEN, URINE: 0.2 EU/DL

## 2024-03-15 PROCEDURE — 87529 HSV DNA AMP PROBE: CPT

## 2024-03-15 PROCEDURE — 87086 URINE CULTURE/COLONY COUNT: CPT

## 2024-03-15 PROCEDURE — 99203 OFFICE O/P NEW LOW 30 MIN: CPT

## 2024-03-15 PROCEDURE — 87205 SMEAR GRAM STAIN: CPT

## 2024-03-15 PROCEDURE — 81003 URINALYSIS AUTO W/O SCOPE: CPT | Mod: CLIA WAIVED TEST

## 2024-03-15 PROCEDURE — 1036F TOBACCO NON-USER: CPT

## 2024-03-15 PROCEDURE — 87661 TRICHOMONAS VAGINALIS AMPLIF: CPT

## 2024-03-15 PROCEDURE — 87800 DETECT AGNT MULT DNA DIREC: CPT

## 2024-03-15 RX ORDER — SULFAMETHOXAZOLE AND TRIMETHOPRIM 800; 160 MG/1; MG/1
TABLET ORAL
COMMUNITY
Start: 2017-11-28

## 2024-03-15 RX ORDER — FLUCONAZOLE 150 MG/1
TABLET ORAL
Qty: 2 TABLET | Refills: 0 | Status: SHIPPED | OUTPATIENT
Start: 2024-03-15

## 2024-03-15 RX ORDER — CLOTRIMAZOLE AND BETAMETHASONE DIPROPIONATE 10; .64 MG/G; MG/G
1 CREAM TOPICAL 2 TIMES DAILY
Qty: 15 G | Refills: 0 | Status: SHIPPED | OUTPATIENT
Start: 2024-03-15 | End: 2024-03-25

## 2024-03-15 ASSESSMENT — ENCOUNTER SYMPTOMS
HEMATURIA: 0
COUGH: 0
CONSTITUTIONAL NEGATIVE: 1
FEVER: 0
ARTHRALGIAS: 0
NEUROLOGICAL NEGATIVE: 1
DYSURIA: 1
CHILLS: 0
DIZZINESS: 0
FREQUENCY: 1
DIAPHORESIS: 0
WOUND: 0
SHORTNESS OF BREATH: 0
HEADACHES: 0
DIARRHEA: 0
BACK PAIN: 1
MYALGIAS: 0
VOMITING: 0
ABDOMINAL PAIN: 0
COLOR CHANGE: 0
NAUSEA: 0
PALPITATIONS: 0
FLANK PAIN: 0
CARDIOVASCULAR NEGATIVE: 1
RESPIRATORY NEGATIVE: 1
FATIGUE: 0
GASTROINTESTINAL NEGATIVE: 1

## 2024-03-15 NOTE — PROGRESS NOTES
Subjective     Jade Yoon is a 28 y.o. female who presents for UTI.    Patient presents with bladder pressure, vaginal pain, burning with urination, lower abdominal/back pain and chills for the last 10 days. She states that she was seen elsewhere a week ago and prescribed bactrim x 5 days which she has 1 dose remaining of. She states that she is sexually active and wants tested for STIs. LMP was 3 weeks ago and was normal.         History provided by:  Patient and medical records  UTI  Associated symptoms: no abdominal pain, no chest pain, no cough, no diarrhea, no fatigue, no fever, no headaches, no myalgias, no nausea, no rash, no shortness of breath and no vomiting        /85   Pulse 89   Temp 37.1 °C (98.7 °F)   Wt 62.1 kg (137 lb)   SpO2 98%   BMI 23.52 kg/m²    All vitals have been reviewed and are stable.    Review of Systems   Constitutional: Negative.  Negative for chills, diaphoresis, fatigue and fever.   Respiratory: Negative.  Negative for cough and shortness of breath.    Cardiovascular: Negative.  Negative for chest pain and palpitations.   Gastrointestinal: Negative.  Negative for abdominal pain, diarrhea, nausea and vomiting.   Genitourinary:  Positive for dysuria, frequency, pelvic pain, urgency and vaginal pain. Negative for flank pain, genital sores, hematuria, menstrual problem, vaginal bleeding and vaginal discharge.   Musculoskeletal:  Positive for back pain. Negative for arthralgias and myalgias.   Skin: Negative.  Negative for color change, rash and wound.   Neurological: Negative.  Negative for dizziness and headaches.       Objective   Physical Exam  Vitals and nursing note reviewed. Exam conducted with a chaperone present.   Constitutional:       General: She is awake. She is not in acute distress.     Appearance: Normal appearance. She is well-developed.   HENT:      Head: Normocephalic and atraumatic.      Nose: Nose normal.      Mouth/Throat:      Lips: Pink.      Mouth:  Mucous membranes are moist.      Pharynx: Oropharynx is clear.   Eyes:      Extraocular Movements: Extraocular movements intact.      Conjunctiva/sclera: Conjunctivae normal.      Pupils: Pupils are equal, round, and reactive to light.   Cardiovascular:      Rate and Rhythm: Normal rate and regular rhythm.   Pulmonary:      Effort: Pulmonary effort is normal. No respiratory distress.   Abdominal:      General: Abdomen is flat. There is no distension.      Tenderness: There is no abdominal tenderness.   Genitourinary:     Exam position: Lithotomy position.      Labia:         Right: Rash (white clumps) and tenderness present.         Left: Rash (white clumps) and tenderness present.       Urethra: No urethral swelling or urethral lesion.      Vagina: Vaginal discharge (minimal) present. No erythema, bleeding or lesions.      Cervix: No erythema.   Musculoskeletal:         General: No swelling or deformity. Normal range of motion.      Cervical back: Normal range of motion.   Skin:     General: Skin is warm and dry.   Neurological:      General: No focal deficit present.      Mental Status: She is alert and oriented to person, place, and time. Mental status is at baseline.      Motor: Motor function is intact.      Coordination: Coordination is intact.   Psychiatric:         Mood and Affect: Mood and affect normal.         Speech: Speech normal.         Behavior: Behavior normal.         Thought Content: Thought content normal.         Judgment: Judgment normal.         Assessment/Plan   Problem List Items Addressed This Visit    None  Visit Diagnoses       Dysuria    -  Primary    Relevant Orders    POCT UA Automated manually resulted (Completed)    Urine Culture    C. Trachomatis / N. Gonorrhoeae, Amplified Detection    Trichomonas vaginalis, Nucleic Acid Detection    Vaginitis Gram Stain For Bacterial Vaginosis + Yeast    HSV PCR    Vaginal pain        Relevant Medications    fluconazole (Diflucan) 150 mg tablet     Other Relevant Orders    C. Trachomatis / N. Gonorrhoeae, Amplified Detection    Trichomonas vaginalis, Nucleic Acid Detection    Vaginitis Gram Stain For Bacterial Vaginosis + Yeast    HSV PCR    Antibiotic-induced yeast infection        Relevant Medications    fluconazole (Diflucan) 150 mg tablet    Candidal skin infection        Relevant Medications    fluconazole (Diflucan) 150 mg tablet    clotrimazole-betamethasone (Lotrisone) cream            Red flags for reporting to ER have been reviewed with the patient.    Current diagnosis, any medication changes, and all in-office lab or radiologic results have been reviewed with the patient at the time of the visit.   If symptoms do not improve or worsen, patient is to follow up with PCP or report to the emergency room.   Patient is alert and oriented x3 and non-toxic appearing. Vital signs are stable.   Patient and/or guardian has sufficient decision-making capabilities at this time and reports understanding and agreement with the treatment plan made through shared decision-making.

## 2024-03-15 NOTE — LETTER
March 15, 2024     Patient: Jade Yoon   YOB: 1995   Date of Visit: 3/15/2024       To Whom It May Concern:    Jade Yoon was seen in my clinic on 3/15/2024 at 9:40 am. Please excuse Jade for her absence from work on this day to make the appointment.    If you have any questions or concerns, please don't hesitate to call.         Sincerely,         Kiana Evans PA-C        CC: No Recipients

## 2024-03-15 NOTE — PATIENT INSTRUCTIONS
VAGINITIS     - Urinalysis was performed in office with no abnormal findings   - Urine culture was sent for further analysis and bacterial sensitivity to antibiotics   - Testing was performed for HSV, bacterial vaginosis (BV), candida (yeast), trichomonas, gonorrhea, and chlamydia   - Results will be delivered by phone in 2-4 days     - Any further treatments will be determined when results return     - Chemical irritants (soaps, lotions, powders, sprays, scented panty liners) and allergens (latex, seminal fluid, chemical preservatives) can increase local sensitivity and risk of infection   - If symptoms worsen or do not improve in 3-6 days, follow up with PCP, if you need a referral, please call our office.       - Phenazopyridine (Azo) may be used for no longer than 2 days to relieve symptoms like urinary frequency and burning -- this will temporarily stain urine dark orange-red   (it will also stain contact lens, so do not wear while taking)     - Drink plenty of water   - Cranberry juice (no added sugar- not cocktail), probiotics, and vitamin C have not been scientifically proven to be beneficial in UTIs, but will not cause harm   - Avoid sugary foods and beverages   - Proper hygiene is most important in preventing UTIs (women should always wipe front to back and urinate soon after intercourse)     - SEEK IMMEDIATE MEDICAL CARE IF: you experience fever, back pain, rash, nausea, vomiting, or confusion  If symptoms worsen or do not improve in 2-4 days, follow up with PCP, if you need a referral, please call our office.

## 2024-03-16 LAB
C TRACH RRNA SPEC QL NAA+PROBE: NEGATIVE
CLUE CELLS VAG LPF-#/AREA: NORMAL /[LPF]
HSV1 DNA SKIN QL NAA+PROBE: NOT DETECTED
HSV2 DNA SKIN QL NAA+PROBE: NOT DETECTED
N GONORRHOEA DNA SPEC QL PROBE+SIG AMP: NEGATIVE
NUGENT SCORE: 0
T VAGINALIS RRNA SPEC QL NAA+PROBE: NEGATIVE
YEAST VAG WET PREP-#/AREA: NORMAL

## 2024-03-17 LAB — BACTERIA UR CULT: NO GROWTH

## 2024-04-17 ENCOUNTER — LAB REQUISITION (OUTPATIENT)
Dept: LAB | Facility: HOSPITAL | Age: 29
End: 2024-04-17
Payer: COMMERCIAL

## 2024-04-17 DIAGNOSIS — R07.0 PAIN IN THROAT: ICD-10-CM

## 2024-04-17 LAB — S PYO DNA THROAT QL NAA+PROBE: NOT DETECTED

## 2024-04-17 PROCEDURE — 87651 STREP A DNA AMP PROBE: CPT

## 2024-05-01 ENCOUNTER — LAB REQUISITION (OUTPATIENT)
Dept: LAB | Facility: HOSPITAL | Age: 29
End: 2024-05-01
Payer: COMMERCIAL

## 2024-05-01 DIAGNOSIS — Z20.2 CONTACT WITH AND (SUSPECTED) EXPOSURE TO INFECTIONS WITH A PREDOMINANTLY SEXUAL MODE OF TRANSMISSION: ICD-10-CM

## 2024-05-01 PROCEDURE — 87661 TRICHOMONAS VAGINALIS AMPLIF: CPT

## 2024-05-01 PROCEDURE — 87086 URINE CULTURE/COLONY COUNT: CPT

## 2024-05-01 PROCEDURE — 87800 DETECT AGNT MULT DNA DIREC: CPT

## 2024-05-02 LAB
C TRACH RRNA SPEC QL NAA+PROBE: NEGATIVE
N GONORRHOEA DNA SPEC QL PROBE+SIG AMP: NEGATIVE
T VAGINALIS RRNA SPEC QL NAA+PROBE: NEGATIVE

## 2024-05-03 LAB — BACTERIA UR CULT: NORMAL

## 2024-09-30 ENCOUNTER — OFFICE VISIT (OUTPATIENT)
Dept: URGENT CARE | Age: 29
End: 2024-09-30
Payer: COMMERCIAL

## 2024-09-30 VITALS
SYSTOLIC BLOOD PRESSURE: 113 MMHG | RESPIRATION RATE: 16 BRPM | TEMPERATURE: 98.3 F | OXYGEN SATURATION: 99 % | HEART RATE: 89 BPM | DIASTOLIC BLOOD PRESSURE: 78 MMHG

## 2024-09-30 DIAGNOSIS — K52.9 GASTROENTERITIS: Primary | ICD-10-CM

## 2024-09-30 PROCEDURE — 1036F TOBACCO NON-USER: CPT

## 2024-09-30 PROCEDURE — 99214 OFFICE O/P EST MOD 30 MIN: CPT

## 2024-09-30 RX ORDER — ONDANSETRON 4 MG/1
4 TABLET, ORALLY DISINTEGRATING ORAL EVERY 8 HOURS PRN
Qty: 20 TABLET | Refills: 0 | Status: SHIPPED | OUTPATIENT
Start: 2024-09-30 | End: 2024-10-07

## 2024-09-30 ASSESSMENT — ENCOUNTER SYMPTOMS
DIARRHEA: 1
CONSTITUTIONAL NEGATIVE: 1
VOMITING: 1

## 2024-09-30 NOTE — PATIENT INSTRUCTIONS
Take medication as directed for nausea and vomiting     Avoid citrus and spicy foods, alcohol, tobacco products, chocolate, mint, NSAIDs (aleve, naproxen, advil, ibuprofen, motrin).       Foods easier on the stomach are: bananas, rice, applesauce, toast, ginger ale.     Please go to ER if symptoms persist for 2-3 days or if you have severe pain or vomiting.
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
## 2024-10-12 ENCOUNTER — OFFICE VISIT (OUTPATIENT)
Dept: URGENT CARE | Age: 29
End: 2024-10-12
Payer: COMMERCIAL

## 2024-10-12 VITALS
OXYGEN SATURATION: 97 % | RESPIRATION RATE: 18 BRPM | DIASTOLIC BLOOD PRESSURE: 72 MMHG | SYSTOLIC BLOOD PRESSURE: 130 MMHG | HEART RATE: 84 BPM | TEMPERATURE: 97.8 F

## 2024-10-12 DIAGNOSIS — R11.2 NAUSEA AND VOMITING, UNSPECIFIED VOMITING TYPE: Primary | ICD-10-CM

## 2024-10-12 DIAGNOSIS — R19.7 DIARRHEA, UNSPECIFIED TYPE: ICD-10-CM

## 2024-10-12 DIAGNOSIS — R10.31 RIGHT LOWER QUADRANT ABDOMINAL PAIN: ICD-10-CM

## 2024-10-12 DIAGNOSIS — J01.40 ACUTE PANSINUSITIS, RECURRENCE NOT SPECIFIED: ICD-10-CM

## 2024-10-12 RX ORDER — ONDANSETRON HYDROCHLORIDE 8 MG/1
8 TABLET, FILM COATED ORAL ONCE
Status: COMPLETED | OUTPATIENT
Start: 2024-10-12 | End: 2024-10-12

## 2024-10-12 RX ORDER — AMOXICILLIN AND CLAVULANATE POTASSIUM 875; 125 MG/1; MG/1
1 TABLET, FILM COATED ORAL 2 TIMES DAILY
Qty: 20 TABLET | Refills: 0 | Status: SHIPPED | OUTPATIENT
Start: 2024-10-12 | End: 2024-10-22

## 2024-10-12 RX ORDER — ONDANSETRON 4 MG/1
8 TABLET, ORALLY DISINTEGRATING ORAL 2 TIMES DAILY PRN
Qty: 20 TABLET | Refills: 0 | Status: SHIPPED | OUTPATIENT
Start: 2024-10-12 | End: 2024-10-17

## 2024-10-12 ASSESSMENT — ENCOUNTER SYMPTOMS
COUGH: 1
ARTHRALGIAS: 0
WOUND: 1
DIARRHEA: 1
CHILLS: 1
MYALGIAS: 0
FEVER: 1
ABDOMINAL PAIN: 1
HEADACHES: 1
SORE THROAT: 0
VOMITING: 1

## 2024-10-12 NOTE — PATIENT INSTRUCTIONS
Strict follow up discussed as differential diagnosis included appendicitis. If feeling worse, to call 911 or go to the nearest for further evaluation as she might need an abdominal ultrasound.  Encourage fluid and bland diet. Advance diet as tolerated.  Take Peptobismol as needed for diarrhea.  Continue Prilosec.  Continue Zofran as needed for nauseand/or vomiting.  Take Tylenol and/or ibuprofen as needed for aches and pain and/or fever.  Take the antibiotic with food.  Eat yogurt or take probiotic once a day.  Symptoms should improve in 2 to 3 days.   Referred to GI per pt's request.  If symptoms worsened anytime, to call 911 or go to the nearest ER.
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
## 2024-10-12 NOTE — LETTER
October 12, 2024     Patient: Jade Yoon   YOB: 1995   Date of Visit: 10/12/2024       To Whom It May Concern:    Jade Yoon was seen in my clinic on 10/12/2024 at 9:45 am. Please excuse Jade for her absence from work on this day to make the appointment.    If you have any questions or concerns, please don't hesitate to call.         Sincerely,         SUNIL Navarro-CNP        CC: No Recipients